# Patient Record
(demographics unavailable — no encounter records)

---

## 2025-05-08 NOTE — HISTORY OF PRESENT ILLNESS
[FreeTextEntry1] : Prior patient of Dr. Duran. Now presents to establish care with me for ED and hypogonadism.  Mello is a 78-year-old male, born on February 12, 1945 who we last saw in April 2021, where we were following him for erectile dysfunction and low testosterone. At that time he was managed on sildenafil 100 mg with good efficacy without adverse events. We elected not to treat his testosterone as if he responded well enough to sildenafil he felt he would prefer not to take testosterone therapy. He presents today stating that he is having diminishing efficacy to sildenafil. He reports he is no longer as physically active as he once was secondary to fractured foot. Patient is currently experiencing erectile dysfunction.  Office visit 05/08/2025 - Pt  Pt presents today to review labs and symptoms. Pt is currently not on any TRT. Also complains of erectile dysfunction. Has been on Sildenafil 100mg for ED, happy with results and tolerated well with no side effects.  Labs 6/2023 Total Testosterone - 264 Estradiol - 24 HCT - 41.8 AST/ALT - wnl

## 2025-05-08 NOTE — ASSESSMENT
[FreeTextEntry1] : #Organic Impotence - Continue sildenafil 100 mg  - No plan for Trial of Intracavernosal penile injections with Trimix (Papaverine 30mg/mL, phentolamine 1mg/mL, PGE1 10 mcg/mL) and Penile Doppler US at this time.  #PSA screening - PSA  #LUTS - weak stream  - Uroflow/PVR - Bladder US - wants to hold off on alpha blockers at this time - UA/UCx Discussed behavioral modifications: 1) Double voiding 2) Avoid caffeine, alcohol, spicy foods, tea, soda, artificial sweeteners, drinking liquids 3-4 hrs before bedtime 3) Perform leg elevation prior to sleeping 4) Avoid constipation by increasing dietary fiber intake with daily supplements to achieve 25 grams of fiber per day.  Increase daily hydration to 64oz of non-caffeinated liquids.  Use stool softeners and/or laxatives as needed if still unable to have daily soft BM without straining.   The following was also discussed with the patient: Erectile dysfunction, its etiology, risk factors and natural history were discussed with the patient. Work-up and empiric management were discussed. From least to most invasive, treatments including behavioral changes (weight loss, exercise, improved sleep), oral PDE5i, vacuum erection device, intraurethral pellets, intracavernosal injections, and penile prosthetic implantation were described. Relevant individual risks and benefits disclosed. I explained that there are different causes for ED including psychogenic, vasculogenic, neurogenic and medication side-effect related causes. Oftentimes there are multiple causes.   The patient understands that the risks of PDE5is include facial redness, flushing, GERD, back pain, priapism, chest pain/MI, arrhythmia, dizziness, drop in BP, impaired vision and loss of hearing. He understands that the medication may take up to an hour to function and requires sexual stimulation. He was told not to take his medication within 4 hours of alpha blockers, or not at all if ever taking nitroglycerin. Both sildenafil and vardenafil, but not tadalafil, have some cross-reactivity with PDE6 and thus may produce visual side effects. He also was told to go to the ER immediately if he noticed any blindness or visual changes, or for any painful erection lasting > 4 hrs. He denies any history nitrate medication use for angina.

## 2025-05-08 NOTE — PHYSICAL EXAM
[General Appearance - Well Developed] : well developed [General Appearance - Well Nourished] : well nourished [Normal Appearance] : normal appearance [Well Groomed] : well groomed [General Appearance - In No Acute Distress] : no acute distress [Respiration, Rhythm And Depth] : normal respiratory rhythm and effort [Exaggerated Use Of Accessory Muscles For Inspiration] : no accessory muscle use [Abdomen Soft] : soft [Abdomen Tenderness] : non-tender [Costovertebral Angle Tenderness] : no ~M costovertebral angle tenderness [Urethral Meatus] : meatus normal [Penis Abnormality] : normal circumcised penis [Urinary Bladder Findings] : the bladder was normal on palpation [Scrotum] : the scrotum was normal [Rectal Exam - Seminal Vesicles] : the seminal vesicles were normal [Epididymis] : the epididymides were normal [Testes Tenderness] : no tenderness of the testes [Testes Mass (___cm)] : there were no testicular masses [Normal Station and Gait] : the gait and station were normal for the patient's age [] : no rash [No Focal Deficits] : no focal deficits [Oriented To Time, Place, And Person] : oriented to person, place, and time [Affect] : the affect was normal [Mood] : the mood was normal [Not Anxious] : not anxious [de-identified] : Normal testicles b/l. No pain or discomfort

## 2025-06-06 NOTE — ASSESSMENT
[FreeTextEntry1] : #Organic Impotence - Continue sildenafil 100 mg - testosterone 287 - No plan for Trial of Intracavernosal penile injections with Trimix (Papaverine 30mg/mL, phentolamine 1mg/mL, PGE1 10 mcg/mL) and Penile Doppler US at this time.  #LUTS - weak stream - Uroflow Qmax 10ml/s - Bladder US showed prostate 47.9, PVR 34cc. - wants to hold off on alpha blockers at this time - UA/UCx - no growth Discussed behavioral modifications as previously discussed  Fu in 1 year for symptom check and PSA  The patient understands that the risks of PDE5is include facial redness, flushing, GERD, back pain, priapism, chest pain/MI, arrhythmia, dizziness, drop in BP, impaired vision and loss of hearing. He understands that the medication may take up to an hour to function and requires sexual stimulation. He was told not to take his medication within 4 hours of alpha blockers, or not at all if ever taking nitroglycerin. Both sildenafil and vardenafil, but not tadalafil, have some cross-reactivity with PDE6 and thus may produce visual side effects. He also was told to go to the ER immediately if he noticed any blindness or visual changes, or for any painful erection lasting > 4 hrs. He denies any history nitrate medication use for angina.

## 2025-06-06 NOTE — PLAN
